# Patient Record
Sex: MALE | Race: WHITE | Employment: FULL TIME | URBAN - METROPOLITAN AREA
[De-identification: names, ages, dates, MRNs, and addresses within clinical notes are randomized per-mention and may not be internally consistent; named-entity substitution may affect disease eponyms.]

---

## 2019-01-02 ENCOUNTER — OFFICE VISIT (OUTPATIENT)
Dept: FAMILY MEDICINE CLINIC | Facility: CLINIC | Age: 59
End: 2019-01-02
Payer: COMMERCIAL

## 2019-01-02 VITALS
RESPIRATION RATE: 16 BRPM | OXYGEN SATURATION: 97 % | HEIGHT: 67 IN | DIASTOLIC BLOOD PRESSURE: 85 MMHG | SYSTOLIC BLOOD PRESSURE: 137 MMHG | HEART RATE: 64 BPM | BODY MASS INDEX: 29.82 KG/M2 | WEIGHT: 190 LBS | TEMPERATURE: 98 F

## 2019-01-02 DIAGNOSIS — H60.502 ACUTE OTITIS EXTERNA OF LEFT EAR, UNSPECIFIED TYPE: ICD-10-CM

## 2019-01-02 DIAGNOSIS — H61.22 IMPACTED CERUMEN OF LEFT EAR: Primary | ICD-10-CM

## 2019-01-02 PROCEDURE — 99202 OFFICE O/P NEW SF 15 MIN: CPT | Performed by: NURSE PRACTITIONER

## 2019-01-02 RX ORDER — OFLOXACIN 3 MG/ML
5 SOLUTION AURICULAR (OTIC) 2 TIMES DAILY
Qty: 1 BOTTLE | Refills: 0 | Status: SHIPPED | OUTPATIENT
Start: 2019-01-02 | End: 2019-01-09

## 2019-01-02 NOTE — PATIENT INSTRUCTIONS
Cera de oído retenida  La cera de oído retenida se produce por la acumulación de la cera natural del oído (cerumen). Es muy común que quede cera de oído retenida. [place] puede causar síntomas, kareen pérdida de la audición.  También puede dificultar que un mé Introducir objetos repetidas veces en el oído también pueden provocar la retención de cerumen. Por ejemplo, poner bastoncillos de algodón en el oído puede empujar el cerumen más adentro del oído. Con el paso del [place], esto puede causar un bloqueo.  Los au Los proveedores de atención médica desaconsejan el uso de arcenio para [employer] oídos y de equipos de vacío para el oído. No está probado que estos métodos funcionen y pueden causar problemas.    Prevención de la retención de cerumen  Es posible que usted · No intente limpiar el canal auditivo. Eso puede empujar el pus y las bacterias y hacer que entren todavía más en el canal.  · Use las gotas que le recetaron para los oídos según las indicaciones.  Estas ayudan a reducir la hinchazón y combaten la infecció · Se presentan enrojecimiento o hinchazón en el oído externo o estos síntomas empeoran  · Dolor de mustapha  · Dolor de camilo o rigidez del camilo  · Somnolencia o confusión  · Aretm de 100.4º F (38º C) o más, o según le haya indicado almodovar proveedor de atenc Para los casos de infección del conducto [place], tire suavemente de la oreja hacia arriba y atrás para facilitar que las gotas entren al [place].  Para los casos de infección del oído medio, apriete el cartílago cubierto por la piel de la parte Si olvida vannessa dosis, úsela lo antes posible. Si es romero la hora de la próxima dosis, aplique sólo yumiko dosis. No use dosis adicionales o dobles. ¿Dónde moses guardar mi medicina? Manténgala fuera del alcance de los niños.   Lequita Hollow a [employer],

## 2019-01-02 NOTE — PROGRESS NOTES
CHIEF COMPLAINT:   Patient presents with:  Cerumen Impaction: left ear x 1 week, decreased hearing. HPI:   Valente Morrow is a 62year old male who presents to clinic today with complaints of left ear pain/clogged. Has had for 1  weeks.  Pain is de EARS: Bilateral tragus non tender with manipulation. External auditory canals Right healthy, Left: erythematous. Right TM: gray, no bulging, no retraction,no effusion, bony landmarks visible.   Left TM: unable to visualize due to cerumen  NOSE: nostrils pa Discussed s/s of worsening infection/condition with Patient and importance of prompt medical re-evaluation including when to seek emergency care.  Patient voiced understanding    May consider OTC tylenol or ibuprofen as needed and directed on packaging · Uso de bastoncillos o cotonetes de algodón para limpiar en lo profundo del canal auditivo  · Bloqueo óseo (hueso) en el oído (osteoma o exostosis)  · Infecciones, kareen del oído externo (otitis externa)  · Enfermedades de la piel, kareen eccema  · Enfermeda En casos aislados, algunos tratamientos para extraer el cerumen pueden causar complicaciones kareen:  · Infección del oído externo (otitis externa)  · Dolor de oído  · Pérdida de la audición por un período corto  · [employer]  · [place] atrapada en el canal [place] La otitis externa (también llamada \"oído de nadador\") es vannessa infección en el canal del oído [place]). Generalmente la causan bacterias o un hongo.  Puede presentarse unos pocos días después de que quede agua atrapada en el canal auditivo (al nadar o bañ · No introduzca ningún objeto en el oído para quitar cera. · Si siente que tiene agua atrapada en almodovar oído, colóquese enseguida gotas para los oídos. Puede comprarlas sin receta en la mayoría de las [facility].  Actúan quitando el agua del canal auditivo.  Para los casos de infección del conducto [place], tire suavemente de la oreja hacia arriba y atrás para facilitar que las gotas entren al [place].  Para los casos de infección del oído medio, apriete el cartílago cubierto por la piel de la parte Si olvida vannessa dosis, úsela lo antes posible. Si es romero la hora de la próxima dosis, aplique sólo yumiko dosis. No use dosis adicionales o dobles. ¿Dónde moses guardar mi medicina? Manténgala fuera del alcance de los niños.   Demetris Rolling a [employer],